# Patient Record
Sex: FEMALE | Race: WHITE
[De-identification: names, ages, dates, MRNs, and addresses within clinical notes are randomized per-mention and may not be internally consistent; named-entity substitution may affect disease eponyms.]

---

## 2017-11-12 NOTE — EDPHY
H & P


Time Seen by Provider: 11/12/17 12:39


HPI/ROS: 





CHIEF COMPLAINT:  Urinary frequency, leg and back pain





HISTORY OF PRESENT ILLNESS:  Patient is a 67-year-old female who presents 

emergency department with urinary frequency.  She had 8 episodes urination over 

the course of the night.  She denies dysuria or hematuria.  She has had no 

abdominal pain.  No nausea vomiting.  The patient states that she has leg pain 

bilaterally extending to her low back.  This is chronic and persistent.  She 

denies fevers or chills.  No Weakness or numbness





REVIEW OF SYSTEMS:  


My complete review of systems is negative except as mentioned in the HPI.


Past Medical/Surgical History: 





Includes hypertension, PTSD, anxiety, pneumonia, diabetes








Smoking Status: Heavy smoker


Physical Exam: 





37.1, 170/86, 76, 99 95% on room air


GENERAL:  No acute distress, alert.  Patient is standing at her bedside leaning 

over and going through her purse


HEENT:  Eyes normal to inspection, normal pharynx, no signs of dehydration.


NECK:  No thyromegaly, no lymphadenopathy, supple.


RESPIRATORY:  Clear to auscultation bilaterally, no rales, rhonchi or wheezing.


CVS:  Regular rate and rhythm, no rubs, murmurs, or gallops.


ABDOMEN:  Soft, nontender, nondistended, no organomegaly.


BACK:  Normal to inspection, no CVA tenderness.


SKIN:  Normal color, no rash, warm, dry.  No pallor.


EXTREMITIES:  No pedal edema, no calf tenderness, no Homans sign or cords, no 

joint swelling.


NEURO/PSYCH:  Alert and oriented x3, normal mood and affect, normal motor 

sensory exam.  No obvious cranial nerve deficit.


Constitutional: 


 Initial Vital Signs











Temperature (C)  37.1 C   11/12/17 11:39


 


Heart Rate  76   11/12/17 11:39


 


Respiratory Rate  19   11/12/17 11:39


 


Blood Pressure  170/86 H  11/12/17 11:39


 


O2 Sat (%)  95   11/12/17 11:39








 











O2 Delivery Mode               Room Air














Allergies/Adverse Reactions: 


 





trazodone Allergy (Severe, Verified 03/16/11 18:55)


 


aspirin [Aspirin] Allergy (Verified 03/16/11 18:54)


 


Penicillins Allergy (Verified 03/16/11 18:54)


 








Home Medications: 














 Medication  Instructions  Recorded


 


ATENOLOL  06/06/10


 


STELAZINE  06/06/10


 


Wellbutrin  06/06/10


 


Xanax  06/06/10


 


CYCLOBENZAPRINE HCL [Flexeril] 5 mg PO TIDPRN PRN #8 tab 12/22/16


 


Phenazopyridine HCl [Pyridium] 100 mg PO TID #6 tab 11/12/17














Medical Decision Making


ED Course/Re-evaluation: 





In the emergency department I discussed possible etiologies with the patient 

answered all her questions.  An IV was placed.  Laboratory studies were 

obtained.





I reviewed the patient's laboratory studies.  White count was minimally 

elevated at 10. Otherwise the CBC was unremarkable.  Chemistry panel was 

normal.  Patient's urine was negative.





I discussed the results with the patient.  On re-examination she continued to 

complain of back discomfort.  She does states this is chronic.  She has no 

abdominal pain.  It is soft nontender nondistended.  She has no focal 

neurologic deficits.  Patient was given Pyridium for her frequency.  She will 

follow up with primary care physician.  She was given warnings prior to leaving.


Differential Diagnosis: 





My differential includes but is not limited to urinary tract infection, 

pyelonephritis, kidney stone, small-bowel obstruction, perforation, bacteremia, 

sepsis





- Data Points


Laboratory Results: 


 Laboratory Results





 11/12/17 12:10 





 11/12/17 12:10 





 











  11/12/17 11/12/17 11/12/17





  12:10 12:10 11:55


 


WBC    10.99 10^3/uL H 10^3/uL  





    (3.80-9.50)  


 


RBC    4.66 10^6/uL 10^6/uL  





    (4.18-5.33)  


 


Hgb    15.2 g/dL g/dL  





    (12.6-16.3)  


 


Hct    43.3 % %  





    (38.0-47.0)  


 


MCV    92.9 fL fL  





    (81.5-99.8)  


 


MCH    32.6 pg pg  





    (27.9-34.1)  


 


MCHC    35.1 g/dL g/dL  





    (32.4-36.7)  


 


RDW    12.6 % %  





    (11.5-15.2)  


 


Plt Count    297 10^3/uL 10^3/uL  





    (150-400)  


 


MPV    9.9 fL fL  





    (8.7-11.7)  


 


Neut % (Auto)    46.1 % %  





    (39.3-74.2)  


 


Lymph % (Auto)    45.2 % H %  





    (15.0-45.0)  


 


Mono % (Auto)    6.3 % %  





    (4.5-13.0)  


 


Eos % (Auto)    1.5 % %  





    (0.6-7.6)  


 


Baso % (Auto)    0.4 % %  





    (0.3-1.7)  


 


Nucleat RBC Rel Count    0.0 % %  





    (0.0-0.2)  


 


Absolute Neuts (auto)    5.07 10^3/uL 10^3/uL  





    (1.70-6.50)  


 


Absolute Lymphs (auto)    4.97 10^3/uL H 10^3/uL  





    (1.00-3.00)  


 


Absolute Monos (auto)    0.69 10^3/uL 10^3/uL  





    (0.30-0.80)  


 


Absolute Eos (auto)    0.17 10^3/uL 10^3/uL  





    (0.03-0.40)  


 


Absolute Basos (auto)    0.04 10^3/uL 10^3/uL  





    (0.02-0.10)  


 


Absolute Nucleated RBC    0.00 10^3/uL 10^3/uL  





    (0-0.01)  


 


Immature Gran %    0.5 % %  





    (0.0-1.1)  


 


Immature Gran #    0.05 10^3/uL 10^3/uL  





    (0.00-0.10)  


 


Sodium  139 mEq/L mEq/L    





   (134-144)   


 


Potassium  4.4 mEq/L mEq/L    





   (3.5-5.2)   


 


Chloride  104 mEq/L mEq/L    





   ()   


 


Carbon Dioxide  22 mEq/l mEq/l    





   (22-31)   


 


Anion Gap  13 mEq/L mEq/L    





   (8-16)   


 


BUN  13 mg/dL mg/dL    





   (7-23)   


 


Creatinine  0.7 mg/dL mg/dL    





   (0.6-1.0)   


 


Estimated GFR  > 60     





    


 


Glucose  110 mg/dL H mg/dL    





   ()   


 


Calcium  9.5 mg/dL mg/dL    





   (8.5-10.4)   


 


Urine Color      COLORLESS 





    


 


Urine Appearance      CLEAR 





    


 


Urine pH      7.0 





     (5.0-7.5) 


 


Ur Specific Gravity      1.002 





     (1.002-1.030) 


 


Urine Protein      NEGATIVE 





     (NEGATIVE) 


 


Urine Ketones      NEGATIVE 





     (NEGATIVE) 


 


Urine Blood      NEGATIVE 





     (NEGATIVE) 


 


Urine Nitrate      NEGATIVE 





     (NEGATIVE) 


 


Urine Bilirubin      NEGATIVE 





     (NEGATIVE) 


 


Urine Urobilinogen      NEGATIVE EU EU





     (0.2-1.0) 


 


Ur Leukocyte Esterase      NEGATIVE 





     (NEGATIVE) 


 


Urine Glucose      NEGATIVE 





     (NEGATIVE) 














Departure





- Departure


Disposition: Home, Routine, Self-Care


Clinical Impression: 


 Frequency of urination





Back pain


Qualifiers:


 Back pain location: low back pain Chronicity: acute Back pain laterality: 

bilateral Sciatica presence: without sciatica Qualified Code(s): M54.5 - Low 

back pain





Condition: Good


Instructions:  Dysuria (ED), Back Pain (ED)


Additional Instructions: 


Return with increasing pain, dysuria, frequency or any other concerns.


Referrals: 


Bridget Garcia DO [Doctor of Osteopathy] - 5-7 days, call for appt.


Prescriptions: 


Phenazopyridine HCl [Pyridium] 100 mg PO TID #6 tab

## 2018-07-08 ENCOUNTER — HOSPITAL ENCOUNTER (EMERGENCY)
Dept: HOSPITAL 80 - FED | Age: 68
Discharge: HOME | End: 2018-07-08
Payer: COMMERCIAL

## 2018-07-08 VITALS — DIASTOLIC BLOOD PRESSURE: 64 MMHG | SYSTOLIC BLOOD PRESSURE: 169 MMHG

## 2018-07-08 DIAGNOSIS — L03.031: Primary | ICD-10-CM

## 2018-07-08 DIAGNOSIS — I10: ICD-10-CM

## 2018-07-08 DIAGNOSIS — E11.9: ICD-10-CM

## 2018-07-08 DIAGNOSIS — F17.200: ICD-10-CM

## 2018-07-08 NOTE — EDPHY
H & P


Stated Complaint: Pt trimmed ingrown nail R great toe, concern about pain and 

infection


Time Seen by Provider: 07/08/18 21:02


HPI/ROS: 





Chief Complaint:  Ingrown toenail





HPI:  The patient presents to the ED with mild erythema on her right great toe.

  She had an ingrown toenail which she cut back earlier today.  She has some 

minimal erythema and tenderness to the area.  She denies fever additional 

complaints.





REVIEW OF SYSTEMS:


Neuro:  no headache, numbness, weakness


Musculoskeletal:  as above


Skin:  As above





- Personal History


Current Tetanus/Diphtheria Vaccine: Unsure





- Medical/Surgical History


Hx Asthma: No


Hx Chronic Respiratory Disease: No


Hx Diabetes: Yes


Hx Cardiac Disease: No


Hx Renal Disease: No


Hx Cirrhosis: No


Hx Alcoholism: No


Hx HIV/AIDS: No


Hx Splenectomy or Spleen Trauma: No


Other PMH: PMH: HTN, PTSD, Anxiety, PNA, Diabetes type 11





- Social History


Smoking Status: Heavy smoker





- Physical Exam


Exam: 





General:  No acute distress


Right foot:  Minimal erythema noted to the right great toe, nail trephination 

performed by patient prior to arrival


Neuro:  Sensation intact to light touch





Constitutional: 





 Initial Vital Signs











Temperature (C)  36.6 C   07/08/18 20:53


 


Heart Rate  86   07/08/18 20:53


 


Respiratory Rate  16   07/08/18 20:53


 


Blood Pressure  169/64 H  07/08/18 20:53


 


O2 Sat (%)  96   07/08/18 20:53








 











O2 Delivery Mode               Room Air














Allergies/Adverse Reactions: 


 





trazodone Allergy (Severe, Verified 07/08/18 20:57)


 


aspirin [Aspirin] Allergy (Verified 07/08/18 20:57)


 


Penicillins Allergy (Verified 07/08/18 20:57)


 








Home Medications: 














 Medication  Instructions  Recorded


 


ATENOLOL  06/06/10


 


STELAZINE  06/06/10


 


Wellbutrin  06/06/10


 


Xanax  06/06/10


 


Cephalexin [Keflex] 500 mg PO TID #21 cap 07/08/18














Medical Decision Making


ED Course/Re-evaluation: 





Patient will be started on Keflex for mild soft tissue infection.  She is given 

her 1st dose in the emergency department.  She is advised to continue to soak 

the toe in warm baths frequently.  She will follow up with her regular 

physician at [facility].  She is discharged home with customary aftercare 

instructions.











Departure





- Departure


Disposition: Home, Routine, Self-Care


Clinical Impression: 


 Cellulitis of right toe





Condition: Good


Instructions:  Cellulitis (ED)


Additional Instructions: 


1.  Take antibiotics as directed for next week.


2. Return to the ED for markedly worsening pain, redness, fever or swelling.


3. Follow up with your regular physician for a recheck within the week.








Referrals: 


Barbara Tucker PA [Physician Assistant] - As per Instructions

## 2019-04-02 ENCOUNTER — HOSPITAL ENCOUNTER (EMERGENCY)
Dept: HOSPITAL 80 - FED | Age: 69
Discharge: HOME | End: 2019-04-02
Payer: COMMERCIAL

## 2019-04-02 VITALS — SYSTOLIC BLOOD PRESSURE: 143 MMHG | DIASTOLIC BLOOD PRESSURE: 85 MMHG

## 2019-04-02 DIAGNOSIS — F41.9: ICD-10-CM

## 2019-04-02 DIAGNOSIS — E86.9: ICD-10-CM

## 2019-04-02 DIAGNOSIS — J20.9: Primary | ICD-10-CM

## 2019-04-02 DIAGNOSIS — E11.9: ICD-10-CM

## 2019-04-02 DIAGNOSIS — F43.10: ICD-10-CM

## 2019-04-02 DIAGNOSIS — F17.200: ICD-10-CM

## 2019-04-02 DIAGNOSIS — I10: ICD-10-CM

## 2019-04-02 LAB
INR PPP: 0.97 (ref 0.83–1.16)
PLATELET # BLD: 289 10^3/UL (ref 150–400)
PROTHROMBIN TIME: 12.5 SEC (ref 12–15)

## 2019-04-02 NOTE — EDPHY
H & P


Stated Complaint: cough, sob, fevers


Time Seen by Provider: 04/02/19 19:41


HPI/ROS: 





CHIEF COMPLAINT:  Cough, fevers and chills





HISTORY OF PRESENT ILLNESS:  Patient is a 68-year-old female who reports a 

history of cough nonproductive as well as fevers and chills for the last month.

  She did get a flu vaccine this year.  She states that this feels similar to 

when she had viral pneumonia in [place] several years ago.  She has not 

taken her temperature.  She is afebrile here.  She is type 2 diabetic on 

metformin.  No headache or neck stiffness.  No new GI or urinary symptoms.  No 

rashes or wounds.


Severity:  Moderate


Modifying factors:  None





REVIEW OF SYSTEMS:


Constitutional:  See HPI


EENTM: denies: blurred vision, double vision, nose congestion


Respiratory:  See HPI


Cardiac: denies: chest pain, irregular heart rate, lightheadedness, palpitations


Gastrointestinal/Abdominal: denies: abdominal pain, diarrhea, nausea, vomiting, 

blood streaked stools


Genitourinary: denies: dysuria, frequency, hematuria, pain


Musculoskeletal: denies: joint pain, muscle pain


Skin: denies: lesions, rash, jaundice, bruising


Neurological: denies: headache, numbness, paresthesia, tingling, dizziness, 

weakness


Hematologic/Lymphatic: denies: blood clots, easy bleeding, easy bruising


Immunologic/allergic: denies: HIV/AIDS, transplant


 10 systems reviewed and negative except as noted





EXAM:


GENERAL:  Elderly, somewhat disheveled


HEAD:  Atraumatic, normocephalic.


EYES:  Pupils equal round and reactive to light, extraocular movements intact, 

sclera anicteric, conjunctiva are normal.


ENT:  TMs normal, nares patent, oropharynx clear without exudates.  Moist 

mucous membranes.


NECK:  Normal range of motion, supple without lymphadenopathy or JVD.


LUNGS:  Breath sounds clear to auscultation bilaterally and equal.  No wheezes 

rales or rhonchi.


HEART:  Regular rate and rhythm without murmurs, rubs or gallops.


ABDOMEN:  Soft, nontender, normoactive bowel sounds.  No guarding, no rebound.  

No masses appreciated. 


BACK:  No CVA tenderness, no spinal tenderness, step-offs or deformities


EXTREMITIES:  Normal range of motion, no pitting or edema.  No clubbing or 

cyanosis.


NEUROLOGICAL:  Cranial nerves II through XII grossly intact.  Normal speech, 

normal gait.  5/5 strength, normal movement in all extremities, normal sensation

, normal reflexes


PSYCH:  Normal mood, normal affect.


SKIN:  Warm, dry, normal turgor, no visible rashes or lesions.








Source: Patient


Exam Limitations: No limitations





- Personal History


Current Tetanus Diphtheria and Acellular Pertussis (TDAP): Yes





- Medical/Surgical History


Hx Asthma: No


Hx Chronic Respiratory Disease: No


Hx Diabetes: Yes


Hx Cardiac Disease: No


Hx Renal Disease: No


Hx Cirrhosis: No


Hx Alcoholism: No


Hx HIV/AIDS: No


Hx Splenectomy or Spleen Trauma: No


Other PMH: PMH: HTN, PTSD, Anxiety, PNA, Diabetes type 11





- Family History


Significant Family History: No pertinent family hx





- Social History


Smoking Status: Heavy smoker


Alcohol Use: None


Constitutional: 


 Initial Vital Signs











Temperature (C)  36.7 C   04/02/19 19:04


 


Heart Rate  103 H  04/02/19 19:04


 


Respiratory Rate  18   04/02/19 19:04


 


Blood Pressure  196/89 H  04/02/19 19:04


 


O2 Sat (%)  95   04/02/19 19:04








 











O2 Delivery Mode               Room Air














Allergies/Adverse Reactions: 


 





trazodone Allergy (Severe, Verified 04/02/19 19:07)


 


aspirin [Aspirin] Allergy (Verified 04/02/19 19:07)


 


cat dander Allergy (Verified 04/02/19 19:07)


 


Penicillins Allergy (Verified 04/02/19 19:07)


 








Home Medications: 














 Medication  Instructions  Recorded


 


ATENOLOL  06/06/10


 


STELAZINE  06/06/10


 


Xanax  06/06/10


 


Azithromycin 250 mg PO DAILY #4 tablet 04/02/19


 


Gabapentin  04/02/19


 


Metformin HCl  04/02/19














Medical Decision Making





- Diagnostics


Imaging Results: 


 Imaging Impressions





Chest X-Ray  04/02/19 19:57


Impression:


1. Suspect airways disease with associated left basilar atelectasis.


2. Borderline cardiac enlargement..


 











Imaging: Discussed imaging studies w/ On call Radiologist


ED Course/Re-evaluation: 





9:00 p.m. patient's x-rays reassuring.  Lab work is reassuring.  She is 

providing a urine sample right now.  I suspect that she does have a viral 

respiratory tract infection.  This is also what she suspects.  She declines 

further workup or testing at this time and is ready to go home.  Will start her 

on albuterol because she states that this helped before.  She is also 

requesting a Z-Bill stating that this always helps her.  Will give her the 1st 

dose here.  Discussed indications for returning.  Respiratory PCR still pending 

however she is out of any type of window for Tamiflu etc.  Urinalysis is 

unremarkable.


Differential Diagnosis: 





Partial list of the Differential diagnosis considered include but were not 

limited to; upper respiratory tract infection, bronchitis, pneumonia and 

although unlikely based on the history and physical exam, I also considered 

sepsis, urinary tract infection.  I discussed these differential diagnoses and 

the plan with the patient as well as the usual and expected course.  The 

patient understands that the diagnosis is provisional and that in medicine we 

are not always correct and that further workup is often warranted.  Usual and 

customary warnings were given.  All of the patient's questions were answered.  

The patient was instructed to return to the emergency department should the 

symptoms at all worsen or return, otherwise to followup with the physician as 

we discussed.





- Data Points


Laboratory Results: 


 Laboratory Results





 04/02/19 20:27 





 04/02/19 20:27 





 











  04/02/19 04/02/19 04/02/19





  21:06 20:27 20:27


 


WBC      





    


 


RBC      





    


 


Hgb      





    


 


Hct      





    


 


MCV      





    


 


MCH      





    


 


MCHC      





    


 


RDW      





    


 


Plt Count      





    


 


MPV      





    


 


Neut % (Auto)      





    


 


Lymph % (Auto)      





    


 


Mono % (Auto)      





    


 


Eos % (Auto)      





    


 


Baso % (Auto)      





    


 


Nucleat RBC Rel Count      





    


 


Absolute Neuts (auto)      





    


 


Absolute Lymphs (auto)      





    


 


Absolute Monos (auto)      





    


 


Absolute Eos (auto)      





    


 


Absolute Basos (auto)      





    


 


Absolute Nucleated RBC      





    


 


Immature Gran %      





    


 


Immature Gran #      





    


 


PT      12.5 SEC SEC





     (12.0-15.0) 


 


INR      0.97 





     (0.83-1.16) 


 


APTT      28.6 SEC SEC





     (23.0-38.0) 


 


VBG Lactic Acid      





    


 


Sodium    133 mEq/L L mEq/L  





    (135-145)  


 


Potassium    4.5 mEq/L mEq/L  





    (3.5-5.2)  


 


Chloride    100 mEq/L mEq/L  





    ()  


 


Carbon Dioxide    23 mEq/l mEq/l  





    (22-31)  


 


Anion Gap    10 mEq/L mEq/L  





    (6-14)  


 


BUN    13 mg/dL mg/dL  





    (7-23)  


 


Creatinine    0.8 mg/dL mg/dL  





    (0.6-1.0)  


 


Estimated GFR    > 60   





    


 


Glucose    123 mg/dL H mg/dL  





    ()  


 


Calcium    10.0 mg/dL mg/dL  





    (8.5-10.4)  


 


Total Bilirubin    0.4 mg/dL mg/dL  





    (0.1-1.4)  


 


Urine Color  PALE YELLOW     





    


 


Urine Appearance  CLEAR     





    


 


Urine pH  7.0     





   (5.0-7.5)   


 


Ur Specific Gravity  1.005     





   (1.002-1.030)   


 


Urine Protein  NEGATIVE     





   (NEGATIVE)   


 


Urine Ketones  NEGATIVE     





   (NEGATIVE)   


 


Urine Blood  NEGATIVE     





   (NEGATIVE)   


 


Urine Nitrate  NEGATIVE     





   (NEGATIVE)   


 


Urine Bilirubin  NEGATIVE     





   (NEGATIVE)   


 


Urine Urobilinogen  NEGATIVE EU EU    





   (0.2-1.0)   


 


Ur Leukocyte Esterase  NEGATIVE     





   (NEGATIVE)   


 


Urine RBC  1-3 /hpf /hpf    





   (0-3)   


 


Urine WBC  1-3 /hpf /hpf    





   (0-3)   


 


Ur Epithelial Cells  NONE SEEN /lpf /lpf    





   (NONE-1+)   


 


Urine Glucose  NEGATIVE     





   (NEGATIVE)   














  04/02/19 04/02/19





  20:27 20:27


 


WBC  11.03 10^3/uL H 10^3/uL  





   (3.80-9.50)  


 


RBC  4.81 10^6/uL 10^6/uL  





   (4.18-5.33)  


 


Hgb  15.4 g/dL g/dL  





   (12.6-16.3)  


 


Hct  45.0 % %  





   (38.0-47.0)  


 


MCV  93.6 fL fL  





   (81.5-99.8)  


 


MCH  32.0 pg pg  





   (27.9-34.1)  


 


MCHC  34.2 g/dL g/dL  





   (32.4-36.7)  


 


RDW  12.4 % %  





   (11.5-15.2)  


 


Plt Count  289 10^3/uL 10^3/uL  





   (150-400)  


 


MPV  9.6 fL fL  





   (8.7-11.7)  


 


Neut % (Auto)  51.2 % %  





   (39.3-74.2)  


 


Lymph % (Auto)  39.1 % %  





   (15.0-45.0)  


 


Mono % (Auto)  6.6 % %  





   (4.5-13.0)  


 


Eos % (Auto)  2.2 % %  





   (0.6-7.6)  


 


Baso % (Auto)  0.4 % %  





   (0.3-1.7)  


 


Nucleat RBC Rel Count  0.0 % %  





   (0.0-0.2)  


 


Absolute Neuts (auto)  5.66 10^3/uL 10^3/uL  





   (1.70-6.50)  


 


Absolute Lymphs (auto)  4.31 10^3/uL H 10^3/uL  





   (1.00-3.00)  


 


Absolute Monos (auto)  0.73 10^3/uL 10^3/uL  





   (0.30-0.80)  


 


Absolute Eos (auto)  0.24 10^3/uL 10^3/uL  





   (0.03-0.40)  


 


Absolute Basos (auto)  0.04 10^3/uL 10^3/uL  





   (0.02-0.10)  


 


Absolute Nucleated RBC  0.00 10^3/uL 10^3/uL  





   (0-0.01)  


 


Immature Gran %  0.5 % %  





   (0.0-1.1)  


 


Immature Gran #  0.05 10^3/uL 10^3/uL  





   (0.00-0.10)  


 


PT    





   


 


INR    





   


 


APTT    





   


 


VBG Lactic Acid    1.9 mmol/L mmol/L





    (0.7-2.1) 


 


Sodium    





   


 


Potassium    





   


 


Chloride    





   


 


Carbon Dioxide    





   


 


Anion Gap    





   


 


BUN    





   


 


Creatinine    





   


 


Estimated GFR    





   


 


Glucose    





   


 


Calcium    





   


 


Total Bilirubin    





   


 


Urine Color    





   


 


Urine Appearance    





   


 


Urine pH    





   


 


Ur Specific Gravity    





   


 


Urine Protein    





   


 


Urine Ketones    





   


 


Urine Blood    





   


 


Urine Nitrate    





   


 


Urine Bilirubin    





   


 


Urine Urobilinogen    





   


 


Ur Leukocyte Esterase    





   


 


Urine RBC    





   


 


Urine WBC    





   


 


Ur Epithelial Cells    





   


 


Urine Glucose    





   











Microbiology Results: 


 MICROBIOLOGY





04/02/19 20:27   Nasal, Sinus - Swab   Respiratory Panel (PCR) - Final


                            No Organism Detected By Pcr





Medications Given: 


 








Discontinued Medications





Albuterol Sulfate (Proventil Inh Prepack)  1 mdi TAKEHOME EDNOW ONE


   Stop: 04/02/19 21:17


   Last Admin: 04/02/19 21:25 Dose:  1 mdi


Azithromycin (Zithromax)  500 mg PO EDNOW ONE


   PRN Reason: Protocol


   Stop: 04/02/19 21:16


   Last Admin: 04/02/19 21:24 Dose:  500 mg


Sodium Chloride (Ns)  2,300 mls @ 4,600 mls/hr 30 ml/kg infuse over 30 min (

2300 ml) IV EDNOW ONE


   PRN Reason: Protocol


   Stop: 04/02/19 20:25


   Last Admin: 04/02/19 20:00 Dose:  2,300 mls





Point of Care Test Results: 


 Urine Dip











Collection Date                04/02/19


 


Collection Time                21:06


 


Specific Gravity (1.002-1.030) 1.005


 


PH (5.0-7.5)                   7.5


 


Leukocytes (Negative)          Negative


 


Nitrites (Negative)            Negative


 


Protein (Negative)             Negative


 


Glucose (Negative)             Negative


 


Ketones (Negative)             Negative


 


Urobilnogen (0.2-1.0 EU)       0.2


 


Bilirubin (Negative)           Negative


 


Blood (Negative)               Negative

















Departure





- Departure


Disposition: Home, Routine, Self-Care


Clinical Impression: 


Acute bronchitis


Qualifiers:


 Bronchitis organism: unspecified organism Qualified Code(s): J20.9 - Acute 

bronchitis, unspecified





Condition: Fair


Instructions:  Albuterol (By breathing), Acute Bronchitis (ED)


Referrals: 


Barbara Tucker PA [Primary Care Provider] - 2-3 days, call for appt.


Prescriptions: 


Azithromycin 250 mg PO DAILY #4 tablet